# Patient Record
Sex: FEMALE
[De-identification: names, ages, dates, MRNs, and addresses within clinical notes are randomized per-mention and may not be internally consistent; named-entity substitution may affect disease eponyms.]

---

## 2018-09-10 NOTE — DIAGNOSTIC IMAGING REPORT
EXAMINATION:  CHEST SINGLE (PORTABLE)    



INDICATION:           Large mediastinum. Chest pain, CHF 



COMPARISON:  None

     

FINDINGS:

TUBES and LINES:  None.



LUNGS:  Lungs are well inflated.  Lungs are clear.   There is no evidence of

pneumonia or pulmonary edema.



PLEURA:  No pleural effusion or pneumothorax.



HEART AND MEDIASTINUM:  The cardiomediastinal silhouette is unremarkable.    



BONES AND SOFT TISSUES:  No acute osseous lesion.  Soft tissues are

unremarkable.



UPPER ABDOMEN: No free air under the diaphragm.    



IMPRESSION: 

No acute thoracic abnormality.





Signed by: Dr. Antoine Vasquez M.D. on 9/10/2018 11:25 PM

## 2018-09-10 NOTE — DIAGNOSTIC IMAGING REPORT
EXAMINATION: Head CT without contrast.  





HISTORY:Headache and dizziness.



COMPARISON:None.

TECHNIQUE: Multidetector axial images were obtained from the foramen magnum to

the vertex without contrast. The images were reconstructed using brain and bone

algorithms.  Thin section brain images were reformatted into coronal and

sagittal planes.

Dose modulation, iterative reconstruction, and/or weight based adjustment of

the mA/kV was utilized to reduce the radiation dose to as low as reasonably

achievable.



Intravenous contrast: None  



IMAGE QUALITY: Acceptable.



FINDINGS:

    Skull/scalp: No lytic or blastic. lesions.  No surgical changes.

    Parenchyma: No abnormal density.

No acute hemorrhage, mass or acute major vascular territorial infarct.

    Arteries: No density suggestive of thrombosis.   

    Dural sinuses: No abnormal density suggestive of thrombosis. 

    Ventricles: No hydrocephalus or displacement.

    Extra-axial spaces: No abnormal density.  

    Brain volume: Normal for age.

    Craniocervical junction: Low-lying cerebellar tonsils, approximately 4 mm

below the level of foramen magnum which is still within normal limits.

    Sella: No mass. 

    Paranasal/mastoid sinuses: Imaged portions unremarkable.



IMPRESSION:

No acute intracranial abnormality.



Signed by: Dr. Melissa Najera M.D. on 9/10/2018 11:17 PM

## 2019-01-07 ENCOUNTER — HOSPITAL ENCOUNTER (EMERGENCY)
Dept: HOSPITAL 88 - FSED | Age: 24
Discharge: HOME | End: 2019-01-07
Payer: SELF-PAY

## 2019-01-07 VITALS — HEIGHT: 61 IN | WEIGHT: 130 LBS | BODY MASS INDEX: 24.55 KG/M2
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
## 2019-01-07 DIAGNOSIS — S01.512A: Primary | ICD-10-CM

## 2019-01-07 PROCEDURE — 99282 EMERGENCY DEPT VISIT SF MDM: CPT

## 2019-01-07 NOTE — XMS REPORT
Patient Summary Document

                             Created on: 2019



ALBERTO WHEELER

External Reference #: [id number]

: 1995

Sex: Female



Demographics







                          Address                   [address] [place]  [address]

 

                          Home Phone                [phone]

 

                          Preferred Language        Unknown

 

                          Marital Status            Unknown

 

                          Scientologist Affiliation     Unknown

 

                          Race                      Unknown

 

                                        Additional Race(s) 

 

 

                          Ethnic Group              Unknown





Author







                          Author                    [facility]

 

                          [facility]

 

                          Address                   Unknown

 

                          Phone                     Unavailable







Support







                Name            Relationship    Address         Phone

 

                    ALEXIS LABOY    PRS                 [address] [place], [place]  [id number] [phone]

 

                    MENDY WHEELER    PRS                 [address] [place], [place]  [address] [phone]







Care Team Providers







                    Care Team Member Name    Role                Phone

 

                    ILYA GRADY       Unavailable         Unavailable







Payers







             Payer Name    Policy Type    Policy Number    Effective Date    Expiration Date







Problems

This patient has no known problems.



Allergies, Adverse Reactions, Alerts







          Allergy Name    Allergy Type    Status    Severity    Reaction(s)    Onset Date    Inactive 

Date                      Treating Clinician        Comments

 

        No Known Allergies    DA      Active    U               2013 00:00:00                     







Medications

This patient has no known medications.



Results







           Test Description    Test Time    Test Comments    Text Results    Atomic Results    Result

 Comments

 

                CHEST SINGLE (PORTABLE)    2018-09-10 23:24:00                        [place] [address]      Patient 
Name: ALBERTO WHEELER   MR #: [id number]    : 1995 Age/Sex: 23/F  Acct
#: [id number] Req #: [id number]  Adm Physician:     Ordered by: MENDEZ GRADY MD  Report #: [id number]   Location: ER  Room/Bed:     
______________________________________________________________________________
_____________________    Procedure: [id number] DX/CHEST SINGLE (PORTABLE)  Exam 
Date: 09/10/18                            Exam Time:        REPORT STATUS: 
Signed    EXAMINATION:  CHEST SINGLE (PORTABLE)          INDICATION:           
Large mediastinum. Chest pain, CHF       COMPARISON:  None           FINDINGS:  
TUBES and LINES:  None.      LUNGS:  Lungs are well inflated.  Lungs are clear. 
 There is no evidence of   pneumonia or pulmonary edema.      PLEURA:  No 
pleural effusion or pneumothorax.      HEART AND MEDIASTINUM:  The 
cardiomediastinal silhouette is unremarkable.          BONES AND SOFT TISSUES:  
No acute osseous lesion.  Soft tissues are   unremarkable.      UPPER ABDOMEN: 
No free air under the diaphragm.          IMPRESSION:    No acute thoracic 
abnormality.         Signed by: Dr. Antoine Vasquez M.D. on 9/10/2018 11:25 PM     
  Dictated By: ANTOINE ACEVEDO MD  Electronically Signed By: ANTOINE CUNNINGHAM MD on 09/10/18 2325  Transcribed By: TEETEE on 09/10/18 2325       COPY 
TO:   MENDEZ GRADY MD                    

 

                CT BRAIN WO     2018-09-10 23:14:00                        [place] [address]      Patient Name: 
ALBERTO WHEELER   MR #: [id number]    : 1995 Age/Sex: 23/F  Acct #: 
[id number] Req #: [id number]  Adm Physician:     Ordered by: MENDEZ GRADY MD 
Report #: [id number]   Location: ER  Room/Bed:     
______________________________________________________________________________
_____________________    Procedure: [id number] CT/CT BRAIN WO  Exam Date: 
09/10/18                            Exam Time:        REPORT STATUS: Signed 
  EXAMINATION: Head CT without contrast.           HISTORY:Headache and 
dizziness.      COMPARISON:None.   TECHNIQUE: Multidetector axial images were 
obtained from the foramen magnum to   the vertex without contrast. The images 
were reconstructed using brain and bone   algorithms.  Thin section brain images
were reformatted into coronal and   sagittal planes.   Dose modulation, 
iterative reconstruction, and/or weight based adjustment of   the mA/kV was 
utilized to reduce the radiation dose to as low as reasonably   achievable.     
Intravenous contrast: None        IMAGE QUALITY: Acceptable.      FINDINGS:     
 Skull/scalp: No lytic or blastic. lesions.  No surgical changes.       
Parenchyma: No abnormal density.   No acute hemorrhage, mass or acute major 
vascular territorial infarct.       Arteries: No density suggestive of 
thrombosis.          Dural sinuses: No abnormal density suggestive of 
thrombosis.        Ventricles: No hydrocephalus or displacement.       Extra-
axial spaces: No abnormal density.         Brain volume: Normal for age.       
Craniocervical junction: Low-lying cerebellar tonsils, approximately 4 mm   
below the level of foramen magnum which is still within normal limits.       
Sella: No mass.        Paranasal/mastoid sinuses: Imaged portions unremarkable. 
    IMPRESSION:   No acute intracranial abnormality.      Signed by: Dr. Melissa Najera M.D. on 9/10/2018 11:17 PM        Dictated By: MELISSA NAJERA MD  Electronically Signed By: MELISSA NAJERA MD on 09/10/18 2317  Transcribed 
By: TEETEE on 09/10/18 2317       COPY TO:   MENDEZ GRADY MD